# Patient Record
Sex: FEMALE | Race: WHITE | NOT HISPANIC OR LATINO | URBAN - METROPOLITAN AREA
[De-identification: names, ages, dates, MRNs, and addresses within clinical notes are randomized per-mention and may not be internally consistent; named-entity substitution may affect disease eponyms.]

---

## 2018-09-13 ENCOUNTER — TRANSCRIBE ORDERS (OUTPATIENT)
Dept: INTERNAL MEDICINE | Facility: CLINIC | Age: 78
End: 2018-09-13

## 2018-09-13 DIAGNOSIS — I63.411 CEREBRAL INFARCTION DUE TO EMBOLISM OF RIGHT MIDDLE CEREBRAL ARTERY (CMS/HCC): Primary | ICD-10-CM

## 2020-11-23 ENCOUNTER — TRANSCRIBE ORDERS (OUTPATIENT)
Dept: SCHEDULING | Age: 80
End: 2020-11-23

## 2020-11-23 DIAGNOSIS — Z11.59 ENCOUNTER FOR SCREENING FOR OTHER VIRAL DISEASES: Primary | ICD-10-CM

## 2020-12-04 ENCOUNTER — HOSPITAL ENCOUNTER (OUTPATIENT)
Facility: HOSPITAL | Age: 80
Setting detail: HOSPITAL OUTPATIENT SURGERY
End: 2020-12-04
Attending: INTERNAL MEDICINE | Admitting: INTERNAL MEDICINE
Payer: MEDICARE

## 2020-12-04 DIAGNOSIS — I49.8 NODAL RHYTHM DISORDER: ICD-10-CM

## 2020-12-04 DIAGNOSIS — I48.0 PAROXYSMAL ATRIAL FIBRILLATION (CMS/HCC): ICD-10-CM

## 2020-12-04 NOTE — H&P
Electrophysiology History & Physical    Date of Service: 12/10/20    Mr. Yee is a pleasant 80 year old male patient of Dr. Araujo presenting to the [facility] Electrophysiology Lab for an elective atrial flutter ablation procedure.  The patient was last seen in the office by Dr. Nakul Swan on 11/20/20.  The chart/records were reviewed, the patient was interviewed and examined.  The procedure technique was re-reviewed with additional questions answered to their verbal understanding.  Risks, alternatives, and potential therapeutic benefits were discussed.  Patient is noted to be taking systemic anticoagulation.  Last dose of Xarelto was ***.  The fact that she is at increased risk for bleeding and bleeding complication was discussed.  Last dose of Toprol XL was ***.  Patient has been NPO since midnight.  Admits to ***.  ***.  Denies recent illness, fever, syncope, dizziness, cough, chest pain, palpitations, nausea, vomiting, diarrhea, edema, myalgia, neuropathy.    Consent form was reviewed, patient executed document verbalizing understanding and wish to proceed.    I have reviewed and agree with the attached H&P.  There have been no pertinent changes to this patient's history since they were last seen in the office.  The most recent laboratory values and today's physical exam are noted below.    Labs:     Ref. Range 12/5/2020 07:54   Sodium Latest Ref Range: 136 - 144 mEQ/L 141   Potassium Latest Ref Range: 3.6 - 5.1 mEQ/L 3.7   Chloride Latest Ref Range: 98 - 109 mEQ/L 99   CO2 Latest Ref Range: 22 - 32 mEQ/L 31   BUN Latest Ref Range: 8 - 20 mg/dL 17   Creatinine Latest Ref Range: 0.6 - 1.1 mg/dL 0.8   Glucose Latest Ref Range: 70 - 99 mg/dL 95   Calcium Latest Ref Range: 8.9 - 10.3 mg/dL 10.6 (H)   AST (SGOT) Latest Ref Range: 15 - 41 IU/L 33   ALT (SGPT) Latest Ref Range: 11 - 54 IU/L 24   Alkaline Phosphatase Latest Ref Range: 35 - 126 IU/L 69   Total Protein Latest Ref Range: 6.0 - 8.2 g/dL 6.7    Albumin Latest Ref Range: 3.4 - 5.0 g/dL 4.1   Bilirubin, Total Latest Ref Range: 0.3 - 1.2 mg/dL 1.2   eGFR Latest Ref Range: >=60.0 mL/min/1.73m*2 >60.0   Anion Gap Latest Ref Range: 3 - 15 mEQ/L 11   WBC Latest Ref Range: 3.80 - 10.50 K/uL 10.44   RBC Latest Ref Range: 3.93 - 5.22 M/uL 4.64   Hemoglobin Latest Ref Range: 11.8 - 15.7 g/dL 15.0   Hematocrit Latest Ref Range: 35.0 - 45.0 % 43.6   MCV Latest Ref Range: 83.0 - 98.0 fL 94.0   MCH Latest Ref Range: 28.0 - 33.2 pg 32.3   MCHC Latest Ref Range: 32.2 - 35.5 g/dL 34.4   RDW Latest Ref Range: 11.7 - 14.4 % 13.2   Platelets Latest Ref Range: 150 - 369 K/uL 306   MPV Latest Ref Range: 9.4 - 12.3 fL 9.5   SARS CoV 2 RNA Latest Ref Range: NOT DETECTED  NOT DETECTED       Physical Exam:  Vital signs ***    ***      Date of service: 11/20/2020 1:45 PM    Background     Reason for Visit: Consultation    History from: Patient    Chief Complaint   #1: Atrial flutter    History of Present Illness:  Ms. Yee Is an 80-year-old  white female who presents to see me in consultation for atrial arrhythmias.  The patient has been diagnosed with atrial fibrillation / atrial flutter.  She is asymptomatic with her arrhythmia.  She does have a history of valvular heart disease.  She does have preserved left ventricular function.  She has severe mitral regurgitation. She has a markedly dilated left atrium.    The patient has suffered with a stroke in the past.  She had a stroke approximately 3 years ago.  She had weakness in her left hand  and left leg.  This has mostly resolved.  she did not have atrial fibrillation at the time of her stroke.      She does have hypertension.  She is not a diabetic and she has never suffered with a myocardial infarction.    Currently the patient feels well.  She denies shortness of breath except when wearing a mask.  Her daughter tells me that she does think that her mother has been short of breath for a number of years.She is  currently on Xarelto and Toprol.  Unfortunately a ventricular response rate is 101 beats per minute at rest today.    Covid-19 CoronaVirus Screening    Past Medical and Surgical Histories   Past Medical History (reviewed - no changes required):   Hypertension.  Mild cerebrovascular disease.  Hypercholesterolemia.  Rt hemispheric Stroke 9/2017.  Atrial flutter 2020.     Echo 10/2020:  EF 60%.  Mitral annular calcifications with moderate to severe MR. Aortic sclerosis with mild AI.  Mild to moderate TR with PA pressure 40 mm Hg.  ECHO 9/2017:  EF 60-65%. Normal LV function. Trace MR. Trace AI. Mild TR with PA pressure 40-45 mm. Right heart dilatation.   ECHO 9/2016:  EF 63%. Normal left ventricular wall motion. Stage II diastolic dysfunction. Mitral and aortic valvular sclerosis. Mild AI. Mild to moderate TR with PA pressure 51 mm. Mild to moderate MR.    NUCSTRESS 7/2017:  EF 66%. Negative for ischemia.     CAROTID 9/2017:  Plaque origin right internal carotid artery without hemodynamic significance.   CAROTIDDUPLEX 4/2016:  <50% ICA.  Carotid duplex 2011: Mild plaque without hemodynamically significant stenosis.    CARDIONET 2017: Sinus rhythm with average heart rate of 54 and range of 42-96 bpm.  Frequent PVCs averaging 260/h.  Occasional PACs averaging 12/h.  Up to 11 beats of SVT.  No atrial fibrillation or atrial flutter seen.      Anemia.   Basal cell CA leg.  Squamous cell CA leg.  Cataract surgery 2016.    Flu shot given 10/22/19 by Dr.Mary Jazzy Pedro office.  Past Surgical History (reviewed - no changes required):   Pessary 2019.    Social History     Smoked Tobacco Usage    Smoking Status:  Never    Detailed Status:  Never smoker    Smokeless Tobacco Usage     Smokeless Status:   Never    Alcohol Usage     Alcohol Status:  Current    She drinks wine infrequently.    Average drink(s) / day: Infrequent    Mostly:   Wine    Additional Social History (reviewed - no changes required):    with 1 son and 2  daughters  -retired.    Family History   Structured Family History  [01]  Father:  FH Cancer;  (11/3/2015).  [01]  Mother:  FH Cancer;  (11/3/2015).  Family History (reviewed - no changes required):   mother , CA @87  father  , prostate  grandmother MI @68     Review of Systems   General: Acknowledges weight loss. Denies weakness and weight gain.   Eyes: Denies change in vision.   Ears/Nose/Throat: Denies decreased hearing.   Cardiovascular: Denies chest pain at rest, chest pain with exercise, palpitations, peripheral edema, PND, orthopnea, dyspnea on exertion and orthostatic symptoms.   Respiratory: Denies productive cough and shortness of breath.   Gastrointestinal: Acknowledges GERD. Denies nausea and vomiting.   Musculoskeletal: Denies myalgias and joint pain.   Skin: Denies rash and itching.   Neurologic: Denies pre-syncope and syncope.   Psychiatric: Acknowledges depression and anxiety.   Endocrine: Denies cold intolerance and heat intolerance.   Heme/Lymphatic: Denies abnormal bruising and bleeding.   Allergic/Immunologic: Acknowledges seasonal allergies. Denies allergic rash.     Physical Exam  Vital Signs:     Height: 60 inches  (10/21/2020).    Weight: 120 pounds    BP: 122/80 mmHg.    Body Mass Index: 23.52    Body Surface Area: 1.50 m2.  General: Well developed and well nourished.   Eyes: ALISA, conjunctiva and sclera clear and no xanthomas.   Neck: Supple, no adenopathy and no neck masses.   Lungs: Clear to auscultation.   Chest: No obvious deformity.   Heart: S1 and S2 physiologic. Positive for irreg/irreg poorly controlled response. Examination of neck veins reveals no neck vein distention.  Carotid artery examination reveals no carotid bruits.    Extremities: There is no peripheral edema cyanosis or clubbing.   Pulses: Radial pulses are normal bilaterally.   Abdomen: Normoactive bowel sounds and soft and non-tender.   Musculoskeletal: Normal gait and normal  strength and tone.   Skin: Warm and dry and no rashes.   Neurologic: Alert, oriented x 3 and appropriate affect.       Resting Electrocardiogram   ECG Date:  11/20/2020   Atrial flutter   Heart Rate: 101 bpm.  QRS: 94 msec. QT: 312 msec.   Cannot exclude old inferior infarction   Cannot exclude old anterior infarction      Medications:   XARELTO 20 MG ORAL TABLET (RIVAROXABAN) one by mouth once a day; Route: ORAL  ATORVASTATIN CALCIUM 80 MG ORAL TABLET (ATORVASTATIN CALCIUM) take half tablet by mouth in the morning andf half tablet by mouth in the evening; Route: ORAL  EZETIMIBE 10MG TABLETS (EZETIMIBE) TAKE 1 TABLET BY MOUTH EVERY DAY  TOPROL XL 50 MG ORAL TABLET EXTENDED RELEASE 24 HOUR (METOPROLOL SUCCINATE) one by mouth twice daily  TRIAMTERENE-HCTZ 37.5-25 MG ORAL TABLET (TRIAMTERENE-HCTZ) one tablet daily  AMLODIPINE BESYLATE 5 MG ORAL TABLET (AMLODIPINE BESYLATE) 1 tablet daily by mouth; Route: ORAL  LEXAPRO 10 MG ORAL TABLET (ESCITALOPRAM OXALATE) 1 tab daily; Route: ORAL  [employer] OMEPRAZOLE 20 MG ORAL TABLET DELAYED RELEASE (OMEPRAZOLE) one tablet daily; Route: ORAL  VITAMIN D3 2000 UNIT ORAL TABLET (CHOLECALCIFEROL) one tablet twice daily by mouth; Route: ORAL  DAILY MULTIVITAMIN ORAL CAPSULE (MULTIPLE VITAMINS-MINERALS) one tablet daily by mouth; Route: ORAL  LEVOTHYROXINE SODIUM 50 MCG ORAL TABLET (LEVOTHYROXINE SODIUM) 1 tablet daily by mouth; Route: ORAL  Medications reviewed today.    Medications list verified with patient by Nakul Swan M.D..    Allergies, Intolerances and/or Therapeutic Variances:    SCOPOLAMINE HBR (SCOPOLAMINE HBR)  [DRUG]  (Onset: 05/01/2012) Critical: years ago pt intolerant  ASPIRIN (ASPIRIN)  [DRUG]  (Onset: 04/22/2015) Moderate: GERD  Allergies and adverse reactions reviewed today.      Problems:   ATRIAL FLUTTER, PAROXYSMAL (ICD-427.32) (JDR30-D13.0)  ATRIAL TACHYCARDIA, ECTOPIC YESIKA RHYTHM (ICD-427.89) (MWZ93-W70.8)  COVID-19 COUNSELING PERFORMED (ICD-V65.49)  (DOP88-Y14.89)  CVA WITH LEFT HEMIPARESIS (ICD-438.20) (YKF70-S18.959)  PVC'S (ICD-427.69) (DXA21-G73.3)  AORTIC INSUFFICIENCY (ICD-424.1) (PRP81-N41.1)  MITRAL REGURGITATION (ICD-424.0) (YLC38-F58.0)  ABNORMAL ELECTROCARDIOGRAM (ICD-794.31) (OQH16-Z37.31)  HYPOTHYROIDISM (ICD-244.9) (JYG39-E90.9)  HYPERCHOLESTEROLEMIA (ICD-272.0) (LDB45-Z03.0)  CARDIAC MURMUR, UNSPECIFIED (ICD-785.2) (XPD33-O95.1)  HYPERTENSION, BENIGN ESSENTIAL (ICD-401.1) (UGA66-W03)  CAROTID ARTERY DISEASE MILD (ICD-433.10) (KKC27-C20.29)  Problems reviewed today.      Impression:  Ms. Yee Is an 80-year-old white female with the following problems:     1. Persistent atrial flutter - the patient is in atrial flutter with an uncontrolled ventricular response rate.  She was in normal rhythm a year ago.  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  She has been anticoagulated with Xarelto.  I recommend that she consider undergoing an ablation procedure to restore sinus rhythm.  This may improve her mitral regurgitation.  Her left atrium is however already significantly enlarged and it is likely that in the long run, she will go into atrial fibrillation.  The patient is relatively asymptomatic.  I have explained the potential risks of the ablation in detail to the patient and her daughter.    2.  PVCs - the patient was noted to have PVCs on her 24 hour Holter.  There were not significant.    3.  Significant mitral regurgitation -the patient's left atrium is enlarged.  She has atrial flutter.  It is likely that she will develop atrial fibrillation in the future and may need a mitral valve repair or replacement.    Return visit as needed.    Medical Decision Making: Review or Order Labs,Rev/Ordr Med Test (ECG; echo; cath)    Electronically Signed by Nakul Swan M.D. on 11/20/2020 at 2:36 PM

## 2020-12-05 ENCOUNTER — APPOINTMENT (OUTPATIENT)
Dept: LAB | Facility: HOSPITAL | Age: 80
End: 2020-12-05
Attending: INTERNAL MEDICINE
Payer: MEDICARE

## 2020-12-05 ENCOUNTER — TRANSCRIBE ORDERS (OUTPATIENT)
Dept: LAB | Facility: HOSPITAL | Age: 80
End: 2020-12-05

## 2020-12-05 DIAGNOSIS — Z11.59 ENCOUNTER FOR SCREENING FOR OTHER VIRAL DISEASES: ICD-10-CM

## 2020-12-05 DIAGNOSIS — I10 ESSENTIAL (PRIMARY) HYPERTENSION: ICD-10-CM

## 2020-12-05 DIAGNOSIS — I34.0 NONRHEUMATIC MITRAL (VALVE) INSUFFICIENCY: ICD-10-CM

## 2020-12-05 DIAGNOSIS — R94.31 ABNORMAL ELECTROCARDIOGRAM (ECG) (EKG): ICD-10-CM

## 2020-12-05 DIAGNOSIS — I35.1 NONRHEUMATIC AORTIC (VALVE) INSUFFICIENCY: ICD-10-CM

## 2020-12-05 DIAGNOSIS — I48.0 PAROXYSMAL ATRIAL FIBRILLATION (CMS/HCC): ICD-10-CM

## 2020-12-05 DIAGNOSIS — I49.3 VENTRICULAR PREMATURE DEPOLARIZATION: ICD-10-CM

## 2020-12-05 DIAGNOSIS — I48.0 PAROXYSMAL ATRIAL FIBRILLATION (CMS/HCC): Primary | ICD-10-CM

## 2020-12-05 LAB
ALBUMIN SERPL-MCNC: 4.1 G/DL (ref 3.4–5)
ALP SERPL-CCNC: 69 IU/L (ref 35–126)
ALT SERPL-CCNC: 24 IU/L (ref 11–54)
ANION GAP SERPL CALC-SCNC: 11 MEQ/L (ref 3–15)
AST SERPL-CCNC: 33 IU/L (ref 15–41)
BILIRUB SERPL-MCNC: 1.2 MG/DL (ref 0.3–1.2)
BUN SERPL-MCNC: 17 MG/DL (ref 8–20)
CALCIUM SERPL-MCNC: 10.6 MG/DL (ref 8.9–10.3)
CHLORIDE SERPL-SCNC: 99 MEQ/L (ref 98–109)
CO2 SERPL-SCNC: 31 MEQ/L (ref 22–32)
CREAT SERPL-MCNC: 0.8 MG/DL (ref 0.6–1.1)
ERYTHROCYTE [DISTWIDTH] IN BLOOD BY AUTOMATED COUNT: 13.2 % (ref 11.7–14.4)
GFR SERPL CREATININE-BSD FRML MDRD: >60 ML/MIN/1.73M*2
GLUCOSE SERPL-MCNC: 95 MG/DL (ref 70–99)
HCT VFR BLDCO AUTO: 43.6 % (ref 35–45)
HGB BLD-MCNC: 15 G/DL (ref 11.8–15.7)
MCH RBC QN AUTO: 32.3 PG (ref 28–33.2)
MCHC RBC AUTO-ENTMCNC: 34.4 G/DL (ref 32.2–35.5)
MCV RBC AUTO: 94 FL (ref 83–98)
PDW BLD AUTO: 9.5 FL (ref 9.4–12.3)
PLATELET # BLD AUTO: 306 K/UL (ref 150–369)
POTASSIUM SERPL-SCNC: 3.7 MEQ/L (ref 3.6–5.1)
PROT SERPL-MCNC: 6.7 G/DL (ref 6–8.2)
RBC # BLD AUTO: 4.64 M/UL (ref 3.93–5.22)
SODIUM SERPL-SCNC: 141 MEQ/L (ref 136–144)
WBC # BLD AUTO: 10.44 K/UL (ref 3.8–10.5)

## 2020-12-05 PROCEDURE — 80053 COMPREHEN METABOLIC PANEL: CPT

## 2020-12-05 PROCEDURE — 85027 COMPLETE CBC AUTOMATED: CPT

## 2020-12-05 PROCEDURE — 36415 COLL VENOUS BLD VENIPUNCTURE: CPT

## 2020-12-05 PROCEDURE — U0003 INFECTIOUS AGENT DETECTION BY NUCLEIC ACID (DNA OR RNA); SEVERE ACUTE RESPIRATORY SYNDROME CORONAVIRUS 2 (SARS-COV-2) (CORONAVIRUS DISEASE [COVID-19]), AMPLIFIED PROBE TECHNIQUE, MAKING USE OF HIGH THROUGHPUT TECHNOLOGIES AS DESCRIBED BY CMS-2020-01-R: HCPCS

## 2020-12-07 LAB — SARS-COV-2 RNA RESP QL NAA+PROBE: NOT DETECTED

## 2020-12-09 ENCOUNTER — ANESTHESIA EVENT (OUTPATIENT)
Dept: CARDIOLOGY | Facility: HOSPITAL | Age: 80
End: 2020-12-09
Payer: MEDICARE

## 2020-12-10 ENCOUNTER — ANESTHESIA (OUTPATIENT)
Dept: CARDIOLOGY | Facility: HOSPITAL | Age: 80
End: 2020-12-10
Payer: MEDICARE

## 2020-12-10 ENCOUNTER — HOSPITAL ENCOUNTER (OUTPATIENT)
Dept: CARDIOLOGY | Facility: HOSPITAL | Age: 80
Discharge: HOME | End: 2020-12-10
Attending: INTERNAL MEDICINE | Admitting: INTERNAL MEDICINE
Payer: MEDICARE

## 2020-12-10 VITALS
HEIGHT: 60 IN | TEMPERATURE: 98.5 F | BODY MASS INDEX: 23.56 KG/M2 | HEART RATE: 76 BPM | SYSTOLIC BLOOD PRESSURE: 119 MMHG | OXYGEN SATURATION: 97 % | DIASTOLIC BLOOD PRESSURE: 59 MMHG | RESPIRATION RATE: 19 BRPM | WEIGHT: 120 LBS

## 2020-12-10 DIAGNOSIS — I48.0 PAROXYSMAL ATRIAL FIBRILLATION (CMS/HCC): ICD-10-CM

## 2020-12-10 DIAGNOSIS — I48.0 AF (PAROXYSMAL ATRIAL FIBRILLATION) (CMS/HCC): ICD-10-CM

## 2020-12-10 DIAGNOSIS — I49.8 NODAL RHYTHM DISORDER: ICD-10-CM

## 2020-12-10 PROCEDURE — 25800000 HC PHARMACY IV SOLUTIONS: Performed by: ANESTHESIOLOGY

## 2020-12-10 PROCEDURE — 93325 DOPPLER ECHO COLOR FLOW MAPG: CPT | Mod: GA

## 2020-12-10 PROCEDURE — 63600000 HC DRUGS/DETAIL CODE: Performed by: ANESTHESIOLOGY

## 2020-12-10 PROCEDURE — C1731 CATH, EP, 20 OR MORE ELEC: HCPCS | Performed by: INTERNAL MEDICINE

## 2020-12-10 PROCEDURE — 25000000 HC PHARMACY GENERAL: Performed by: ANESTHESIOLOGY

## 2020-12-10 PROCEDURE — 25000000 HC PHARMACY GENERAL: Performed by: INTERNAL MEDICINE

## 2020-12-10 PROCEDURE — B246ZZ4 ULTRASONOGRAPHY OF RIGHT AND LEFT HEART, TRANSESOPHAGEAL: ICD-10-PCS | Performed by: INTERNAL MEDICINE

## 2020-12-10 PROCEDURE — 93620 COMP EP EVL R AT VEN PAC&REC: CPT | Performed by: INTERNAL MEDICINE

## 2020-12-10 PROCEDURE — 71000001 HC PACU PHASE 1 INITIAL 30MIN: Performed by: INTERNAL MEDICINE

## 2020-12-10 PROCEDURE — 71000011 HC PACU PHASE 1 EA ADDL MIN: Performed by: INTERNAL MEDICINE

## 2020-12-10 PROCEDURE — C1894 INTRO/SHEATH, NON-LASER: HCPCS | Performed by: INTERNAL MEDICINE

## 2020-12-10 PROCEDURE — 93005 ELECTROCARDIOGRAM TRACING: CPT | Performed by: INTERNAL MEDICINE

## 2020-12-10 PROCEDURE — 4A023FZ MEASUREMENT OF CARDIAC RHYTHM, PERCUTANEOUS APPROACH: ICD-10-PCS | Performed by: INTERNAL MEDICINE

## 2020-12-10 PROCEDURE — 37000002 HC ANESTHESIA MAC: Performed by: INTERNAL MEDICINE

## 2020-12-10 PROCEDURE — C1730 CATH, EP, 19 OR FEW ELECT: HCPCS | Performed by: INTERNAL MEDICINE

## 2020-12-10 PROCEDURE — 27200000 HC STERILE SUPPLY: Performed by: INTERNAL MEDICINE

## 2020-12-10 RX ORDER — AMIODARONE HYDROCHLORIDE 200 MG/1
200 TABLET ORAL DAILY
Qty: 30 TABLET | Refills: 1 | Status: SHIPPED | OUTPATIENT
Start: 2020-12-10 | End: 2021-01-09

## 2020-12-10 RX ORDER — AMLODIPINE BESYLATE 5 MG/1
5 TABLET ORAL DAILY
COMMUNITY

## 2020-12-10 RX ORDER — METOPROLOL TARTRATE 50 MG/1
50 TABLET ORAL 2 TIMES DAILY
COMMUNITY

## 2020-12-10 RX ORDER — LEVOTHYROXINE SODIUM 50 UG/1
50 TABLET ORAL
COMMUNITY

## 2020-12-10 RX ORDER — PROPOFOL 200MG/20ML
SYRINGE (ML) INTRAVENOUS AS NEEDED
Status: DISCONTINUED | OUTPATIENT
Start: 2020-12-10 | End: 2020-12-10 | Stop reason: SURG

## 2020-12-10 RX ORDER — TRIAMTERENE/HYDROCHLOROTHIAZID 37.5-25 MG
1 TABLET ORAL EVERY MORNING
COMMUNITY

## 2020-12-10 RX ORDER — ATORVASTATIN CALCIUM 40 MG/1
40 TABLET, FILM COATED ORAL 2 TIMES DAILY
COMMUNITY

## 2020-12-10 RX ORDER — LIDOCAINE HYDROCHLORIDE 10 MG/ML
INJECTION, SOLUTION INFILTRATION; PERINEURAL AS NEEDED
Status: DISCONTINUED | OUTPATIENT
Start: 2020-12-10 | End: 2020-12-10 | Stop reason: HOSPADM

## 2020-12-10 RX ORDER — ACETAMINOPHEN 325 MG/1
650 TABLET ORAL EVERY 4 HOURS PRN
Status: DISCONTINUED | OUTPATIENT
Start: 2020-12-10 | End: 2020-12-10 | Stop reason: HOSPADM

## 2020-12-10 RX ORDER — PROPOFOL 10 MG/ML
INJECTION, EMULSION INTRAVENOUS CONTINUOUS PRN
Status: DISCONTINUED | OUTPATIENT
Start: 2020-12-10 | End: 2020-12-10 | Stop reason: SURG

## 2020-12-10 RX ORDER — EZETIMIBE AND SIMVASTATIN 10; 10 MG/1; MG/1
1 TABLET ORAL NIGHTLY
COMMUNITY

## 2020-12-10 RX ORDER — SODIUM CHLORIDE 9 MG/ML
INJECTION, SOLUTION INTRAVENOUS CONTINUOUS PRN
Status: DISCONTINUED | OUTPATIENT
Start: 2020-12-10 | End: 2020-12-10 | Stop reason: SURG

## 2020-12-10 RX ORDER — EPHEDRINE SULFATE/0.9% NACL/PF 50 MG/5 ML
SYRINGE (ML) INTRAVENOUS AS NEEDED
Status: DISCONTINUED | OUTPATIENT
Start: 2020-12-10 | End: 2020-12-10 | Stop reason: SURG

## 2020-12-10 RX ORDER — ESCITALOPRAM OXALATE 10 MG/1
10 TABLET ORAL DAILY
COMMUNITY

## 2020-12-10 RX ORDER — LIDOCAINE HYDROCHLORIDE 10 MG/ML
INJECTION, SOLUTION EPIDURAL; INFILTRATION; INTRACAUDAL; PERINEURAL AS NEEDED
Status: DISCONTINUED | OUTPATIENT
Start: 2020-12-10 | End: 2020-12-10 | Stop reason: SURG

## 2020-12-10 RX ORDER — FENTANYL CITRATE 50 UG/ML
INJECTION, SOLUTION INTRAMUSCULAR; INTRAVENOUS AS NEEDED
Status: DISCONTINUED | OUTPATIENT
Start: 2020-12-10 | End: 2020-12-10 | Stop reason: SURG

## 2020-12-10 RX ADMIN — PROPOFOL 50 MCG/KG/MIN: 10 INJECTION, EMULSION INTRAVENOUS at 10:55

## 2020-12-10 RX ADMIN — PROPOFOL 30 MG: 10 INJECTION, EMULSION INTRAVENOUS at 11:10

## 2020-12-10 RX ADMIN — SODIUM CHLORIDE: 9 INJECTION, SOLUTION INTRAVENOUS at 10:41

## 2020-12-10 RX ADMIN — Medication 5 MG: at 11:17

## 2020-12-10 RX ADMIN — FENTANYL CITRATE 25 MCG: 50 INJECTION INTRAMUSCULAR; INTRAVENOUS at 11:13

## 2020-12-10 RX ADMIN — LIDOCAINE HYDROCHLORIDE 5 ML: 10 INJECTION, SOLUTION EPIDURAL; INFILTRATION; INTRACAUDAL at 10:51

## 2020-12-10 RX ADMIN — PROPOFOL 30 MG: 10 INJECTION, EMULSION INTRAVENOUS at 11:48

## 2020-12-10 RX ADMIN — PROPOFOL 50 MG: 10 INJECTION, EMULSION INTRAVENOUS at 10:55

## 2020-12-10 RX ADMIN — Medication 10 MG: at 11:19

## 2020-12-10 RX ADMIN — Medication 5 MG: at 11:25

## 2020-12-10 SDOH — HEALTH STABILITY: MENTAL HEALTH: HOW OFTEN DO YOU HAVE A DRINK CONTAINING ALCOHOL?: 2-4 TIMES A MONTH

## 2020-12-10 ASSESSMENT — ENCOUNTER SYMPTOMS: DYSRHYTHMIAS: 1

## 2020-12-10 NOTE — POST-PROCEDURE NOTE
EP Service Post Op Check- EP Study and Cardioversion     80 y.o. female s/p EP study and Cardioversion by Dr. Swan today. She is feeling well. She denies chest pain or shortness of breath. She denies groin pain or back pain.     Visit Vitals  BP (!) 148/81   Pulse (!) 129   Temp 36.9 °C (98.5 °F) (Temporal)   Resp 18   Ht 1.524 m (5')   Wt 54.4 kg (120 lb)   SpO2 98%   BMI 23.44 kg/m²       Physical Exam:  General Appearance: NAD  HEENT: NCAT, MMM, PERRLA, EOMs intact, neck supple  Heart: RRR, S1, S2. No murmurs, rubs or gallops, no JVD  Lungs: CTA bilaterally. No wheezes, rales or rhonchi  Abdomen: soft, non-tender, non-distended  Groins: left groin soft, no bleeding, bruit or hematoma  Extremities: warm, peripheral pulses 2+ bilaterally, no pedal edema bilaterally  Skin: warm, dry, no rashes  Neuro: A&O x3, no focal neurologic deficits     Assessment/Plan:  1) Atrial flutter s/p EP study and Electrical Cardioversion  -recover in holding area  -bedrest x 4 hours  -pressure dressing to groin overnight  -EKG post  -Start amiodarone 200 mg PO q D  -resume Xarelto 20 mg PO q D with dinner  -continue metoprolol 50 mg PO q D  -D/C home later this afternoon if VSS, groin site stable, and patient able to ambulate  -F/U with Dr. Swan as an outpatient on 1/18/21 at 8:30 am    TESFAYE Mendez  12/10/2020 12:24 PM

## 2020-12-10 NOTE — ANESTHESIA POSTPROCEDURE EVALUATION
Patient: Carmen Yee    Procedure Summary     Date: 12/10/20 Room / Location: [facility] EP LAB 3 / [facility] CATH/EP    Anesthesia Start: 1042 Anesthesia Stop: 1220    Procedure: COMPLETE EP STUDY WITH INDUCTION (N/A ) Diagnosis:       Atypical atrial flutter (CMS/HCC)      (Atypical atrial flutter)    Provider: Nakul Swan MD Responsible Provider: Mila Trent MD    Anesthesia Type: MAC ASA Status: 3          Anesthesia Type: MAC  PACU Vitals  12/10/2020 1215 - 12/10/2020 1251      12/10/2020  1223             BP:  (!) 116/54    Pulse:  75    Resp:  16    SpO2:  98 %            Anesthesia Post Evaluation    Pain management: adequate  Patient location during evaluation: PACU  Patient participation: complete - patient participated  Level of consciousness: awake and alert  Cardiovascular status: acceptable  Airway Patency: adequate  Respiratory status: acceptable  Hydration status: acceptable  Anesthetic complications: no

## 2020-12-10 NOTE — Clinical Note
Patient placed on procedure table in supine position with arms at side. Positioning devices: all pressure points padded, arm board under arms, heel pads in place, safety strap applied, wrist straps in place and donut foam under head.

## 2020-12-10 NOTE — ANESTHESIA PREPROCEDURE EVALUATION
Relevant Problems   No relevant active problems       Anesthesia ROS/MED HX    Anesthesia History    Previous anesthetics  Pulmonary - neg  Neuro/Psych    TIA  Cardiovascular   hypertension  Dysrhythmias and atrial flutter   Echocardiogram reviewed, Covid19 Test Reviewed and ECG reviewed  Hematological    anticoagulants (xarelto)  GI/Hepatic- neg  Musculoskeletal- neg  Renal Disease- neg  Endo/Other   Hypothyroidism       History reviewed. No pertinent surgical history.    Physical Exam    Airway   Mallampati: II   TM distance: >3 FB   Neck ROM: full  Cardiovascular - normal   Rhythm: regular   Rate: normalPulmonary - normal   clear to auscultation  Dental - normal        Anesthesia Plan    Plan: MAC    Technique: MAC     Airway: natural airway / supplemental oxygen   ASA 3  Anesthetic plan and risks discussed with: patient  Induction:    intravenous   Postop Plan:   Patient Disposition: phase II then home   Pain Management: IV analgesics

## 2020-12-10 NOTE — DISCHARGE INSTRUCTIONS
Please follow up with cardiology, as directed for a follow-up appointment on Monday January 18, 2021 at 8:30 am with Dr. Swan located at [address] [place] [address]. [address], [place], [place].  For any questions or concerns, please do not hesitate to contact the office at [phone].     Medications:  Please start taking Amiodarone 200 mg by mouth daily.  Continue taking Xarelto 20 mg by mouth daily with dinner.  Continue taking your metoprolol 50 mg by mouth twice a day.  Please continue taking your other medications as previously prescribed.    Post Electrophysiology Study and Cardioversion Discharge Instructions    Groin Site Care:    • A bruise or small lump under the skin where the catheters were is normal. These usually go away within one week.    • Please check your wound site daily to make sure there is no redness, bleeding, or swelling.    • The groin dressing should be removed the day following the procedure. A Band-Aid can be used if needed.    Showering:    • You may shower 24 hours after your procedure. Clean the area with mild soap and water. Do not rub the area. Pat the area dry with a clean towel.    • Do not take a bath or submerge the area under water for three days.    • If you have bleeding where your catheter was:   Lie down and hold direct pressure for 10 minutes. If bleeding does not stop in 10 minutes, or if there is a large amount of bleeding, call 911. If bleeding does stop, rest for at least 4 hours. Call your doctor.    Activity:    • Do not bend over, strain, push, pull, run or lift anything over 10 pounds for 7 days.    • You may exercise in 7 days.    • You may return to work in 7 days.    • You may walk and climb stairs when you return home.    Driving:    • Do not drive a car or use any machinery for 3 days. Someone else must drive you home today.    Diet:    • You may eat your normal diet as per your doctor.    Call your doctor if you have:    • Increased redness, heat, pus, bruising, or  bleeding at the site after 24 hours.    • Swelling at the catheter site.    • Increased pain at the catheter site.    • Numbness, pain or coolness in the leg.    • Temperature of 100.5 degrees Fahrenheit or greater.    • Chest pain or shortness of breath.

## 2020-12-10 NOTE — POST-PROCEDURE NOTE
Same Day Discharge        Discharge instructions given with the emphasis on importance of follow-up visit with physician, necessity of taking antiplatelet medications and evaluation of access site until healed.           No questions at this time. Patient verbalized understanding.

## 2020-12-10 NOTE — Clinical Note
Patient position: flat. Bite block inserted. FARZANA probe inserted via blind insertion technique. Probe inserted without difficulty. FARZANA in progress.

## 2020-12-10 NOTE — H&P
Electrophysiology History & Physical    Date of Service: 12/10/20    Mr. Yee is a pleasant 80 year old male patient of Dr. Araujo presenting to the [facility] Electrophysiology Lab for an elective atrial flutter ablation procedure.  The patient was last seen in the office by Dr. Nakul Swan on 11/20/20.  The chart/records were reviewed, the patient was interviewed and examined.  The procedure technique was re-reviewed with additional questions answered to their verbal understanding.  Risks, alternatives, and potential therapeutic benefits were discussed.  Patient is noted to be taking systemic anticoagulation.  Last dose of Xarelto was yesterday evening.  The fact that she is at increased risk for bleeding and bleeding complication was discussed.  Last dose of Toprol XL was yesterday.  Patient has been NPO since midnight.  She states that she is largely asymptomatic with her atrial flutter. Denies recent illness, fever, syncope, dizziness, cough, chest pain, palpitations, nausea, vomiting, diarrhea, edema, myalgia, neuropathy. She has a history of a right sided stroke approximately 3 years ago however all left sided deficits have resolved. She presents today in atrial flutter.    Consent form was reviewed, patient executed document verbalizing understanding and wish to proceed.    I have reviewed and agree with the attached H&P.  There have been no pertinent changes to this patient's history since they were last seen in the office.  The most recent laboratory values and today's physical exam are noted below.    Plan:  1) persistent atrial flutter vs. Atrial tachycardia- presents today for ablation  - given her largely asymptomatic nature we will proceed with right sided ablation, if arrhythmia proves to be originating from the left atrium then we will pursue cardioversion and antiarrhythmic initiation  - will require FARZANA prior to procedure to rule out RUDI thrombus  - continue uninterrupted xarelto  tonight  - post op care to be determined after the procedure  - likely discharge to home today post op    Labs:     Ref. Range 12/5/2020 07:54   Sodium Latest Ref Range: 136 - 144 mEQ/L 141   Potassium Latest Ref Range: 3.6 - 5.1 mEQ/L 3.7   Chloride Latest Ref Range: 98 - 109 mEQ/L 99   CO2 Latest Ref Range: 22 - 32 mEQ/L 31   BUN Latest Ref Range: 8 - 20 mg/dL 17   Creatinine Latest Ref Range: 0.6 - 1.1 mg/dL 0.8   Glucose Latest Ref Range: 70 - 99 mg/dL 95   Calcium Latest Ref Range: 8.9 - 10.3 mg/dL 10.6 (H)   AST (SGOT) Latest Ref Range: 15 - 41 IU/L 33   ALT (SGPT) Latest Ref Range: 11 - 54 IU/L 24   Alkaline Phosphatase Latest Ref Range: 35 - 126 IU/L 69   Total Protein Latest Ref Range: 6.0 - 8.2 g/dL 6.7   Albumin Latest Ref Range: 3.4 - 5.0 g/dL 4.1   Bilirubin, Total Latest Ref Range: 0.3 - 1.2 mg/dL 1.2   eGFR Latest Ref Range: >=60.0 mL/min/1.73m*2 >60.0   Anion Gap Latest Ref Range: 3 - 15 mEQ/L 11   WBC Latest Ref Range: 3.80 - 10.50 K/uL 10.44   RBC Latest Ref Range: 3.93 - 5.22 M/uL 4.64   Hemoglobin Latest Ref Range: 11.8 - 15.7 g/dL 15.0   Hematocrit Latest Ref Range: 35.0 - 45.0 % 43.6   MCV Latest Ref Range: 83.0 - 98.0 fL 94.0   MCH Latest Ref Range: 28.0 - 33.2 pg 32.3   MCHC Latest Ref Range: 32.2 - 35.5 g/dL 34.4   RDW Latest Ref Range: 11.7 - 14.4 % 13.2   Platelets Latest Ref Range: 150 - 369 K/uL 306   MPV Latest Ref Range: 9.4 - 12.3 fL 9.5   SARS CoV 2 RNA Latest Ref Range: NOT DETECTED  NOT DETECTED       Physical Exam:  Physical Exam   Constitutional: She is oriented to person, place, and time. She appears well-developed and well-nourished.   HENT:   Head: Normocephalic.   Eyes: Pupils are equal, round, and reactive to light.   Neck: Normal range of motion. Neck supple.   Cardiovascular: Normal rate and intact distal pulses.   Pulmonary/Chest: Effort normal and breath sounds normal.   Abdominal: Soft. Bowel sounds are normal.   Musculoskeletal: Normal range of motion.   Neurological:  She is alert and oriented to person, place, and time.   Skin: Skin is warm and dry.     EKG 12/10/2020: atrial flutter vs. Atrial tachycardia    Date of service: 11/20/2020 1:45 PM    Background     Reason for Visit: Consultation    History from: Patient    Chief Complaint   #1: Atrial flutter    History of Present Illness:  Ms. Yee Is an 80-year-old  white female who presents to see me in consultation for atrial arrhythmias.  The patient has been diagnosed with atrial fibrillation / atrial flutter.  She is asymptomatic with her arrhythmia.  She does have a history of valvular heart disease.  She does have preserved left ventricular function.  She has severe mitral regurgitation. She has a markedly dilated left atrium.    The patient has suffered with a stroke in the past.  She had a stroke approximately 3 years ago.  She had weakness in her left hand  and left leg.  This has mostly resolved.  she did not have atrial fibrillation at the time of her stroke.      She does have hypertension.  She is not a diabetic and she has never suffered with a myocardial infarction.    Currently the patient feels well.  She denies shortness of breath except when wearing a mask.  Her daughter tells me that she does think that her mother has been short of breath for a number of years. She is currently on Xarelto and Toprol.  Unfortunately a ventricular response rate is 101 beats per minute at rest today.    Covid-19 CoronaVirus Screening    Past Medical and Surgical Histories   Past Medical History (reviewed - no changes required):   Hypertension.  Mild cerebrovascular disease.  Hypercholesterolemia.  Rt hemispheric Stroke 9/2017.  Atrial flutter 2020.     Echo 10/2020:  EF 60%.  Mitral annular calcifications with moderate to severe MR. Aortic sclerosis with mild AI.  Mild to moderate TR with PA pressure 40 mm Hg.  ECHO 9/2017:  EF 60-65%. Normal LV function. Trace MR. Trace AI. Mild TR with PA pressure 40-45 mm. Right  heart dilatation.   ECHO 2016:  EF 63%. Normal left ventricular wall motion. Stage II diastolic dysfunction. Mitral and aortic valvular sclerosis. Mild AI. Mild to moderate TR with PA pressure 51 mm. Mild to moderate MR.    NUCSTRESS 2017:  EF 66%. Negative for ischemia.     CAROTID 2017:  Plaque origin right internal carotid artery without hemodynamic significance.   CAROTIDDUPLEX 2016:  <50% ICA.  Carotid duplex : Mild plaque without hemodynamically significant stenosis.    CARDIONET 2017: Sinus rhythm with average heart rate of 54 and range of 42-96 bpm.  Frequent PVCs averaging 260/h.  Occasional PACs averaging 12/h.  Up to 11 beats of SVT.  No atrial fibrillation or atrial flutter seen.      Anemia.   Basal cell CA leg.  Squamous cell CA leg.  Cataract surgery .    Flu shot given 10/22/19 by Dr.Mary Jazzy Pedro office.  Past Surgical History (reviewed - no changes required):   Pessary 2019.    Social History     Smoked Tobacco Usage    Smoking Status:  Never    Detailed Status:  Never smoker    Smokeless Tobacco Usage     Smokeless Status:   Never    Alcohol Usage     Alcohol Status:  Current    She drinks wine infrequently.    Average drink(s) / day: Infrequent    Mostly:   Wine    Additional Social History (reviewed - no changes required):    with 1 son and 2 daughters  -retired.    Family History   Structured Family History  [01]  Father:  FH Cancer;  (11/3/2015).  [01]  Mother:  FH Cancer;  (11/3/2015).  Family History (reviewed - no changes required):   mother , CA @87  father  , prostate  grandmother MI @68     Review of Systems   General: Acknowledges weight loss. Denies weakness and weight gain.   Eyes: Denies change in vision.   Ears/Nose/Throat: Denies decreased hearing.   Cardiovascular: Denies chest pain at rest, chest pain with exercise, palpitations, peripheral edema, PND, orthopnea, dyspnea on exertion and orthostatic symptoms.    Respiratory: Denies productive cough and shortness of breath.   Gastrointestinal: Acknowledges GERD. Denies nausea and vomiting.   Musculoskeletal: Denies myalgias and joint pain.   Skin: Denies rash and itching.   Neurologic: Denies pre-syncope and syncope.   Psychiatric: Acknowledges depression and anxiety.   Endocrine: Denies cold intolerance and heat intolerance.   Heme/Lymphatic: Denies abnormal bruising and bleeding.   Allergic/Immunologic: Acknowledges seasonal allergies. Denies allergic rash.     Physical Exam  Vital Signs:     Height: 60 inches  (10/21/2020).    Weight: 120 pounds    BP: 122/80 mmHg.    Body Mass Index: 23.52    Body Surface Area: 1.50 m2.  General: Well developed and well nourished.   Eyes: ALISA, conjunctiva and sclera clear and no xanthomas.   Neck: Supple, no adenopathy and no neck masses.   Lungs: Clear to auscultation.   Chest: No obvious deformity.   Heart: S1 and S2 physiologic. Positive for irreg/irreg poorly controlled response. Examination of neck veins reveals no neck vein distention.  Carotid artery examination reveals no carotid bruits.    Extremities: There is no peripheral edema cyanosis or clubbing.   Pulses: Radial pulses are normal bilaterally.   Abdomen: Normoactive bowel sounds and soft and non-tender.   Musculoskeletal: Normal gait and normal strength and tone.   Skin: Warm and dry and no rashes.   Neurologic: Alert, oriented x 3 and appropriate affect.       Resting Electrocardiogram   ECG Date:  11/20/2020   Atrial flutter   Heart Rate: 101 bpm.  QRS: 94 msec. QT: 312 msec.   Cannot exclude old inferior infarction   Cannot exclude old anterior infarction      Medications:   XARELTO 20 MG ORAL TABLET (RIVAROXABAN) one by mouth once a day; Route: ORAL  ATORVASTATIN CALCIUM 80 MG ORAL TABLET (ATORVASTATIN CALCIUM) take half tablet by mouth in the morning andf half tablet by mouth in the evening; Route: ORAL  EZETIMIBE 10MG TABLETS (EZETIMIBE) TAKE 1 TABLET BY MOUTH  EVERY DAY  TOPROL XL 50 MG ORAL TABLET EXTENDED RELEASE 24 HOUR (METOPROLOL SUCCINATE) one by mouth twice daily  TRIAMTERENE-HCTZ 37.5-25 MG ORAL TABLET (TRIAMTERENE-HCTZ) one tablet daily  AMLODIPINE BESYLATE 5 MG ORAL TABLET (AMLODIPINE BESYLATE) 1 tablet daily by mouth; Route: ORAL  LEXAPRO 10 MG ORAL TABLET (ESCITALOPRAM OXALATE) 1 tab daily; Route: ORAL  [employer] OMEPRAZOLE 20 MG ORAL TABLET DELAYED RELEASE (OMEPRAZOLE) one tablet daily; Route: ORAL  VITAMIN D3 2000 UNIT ORAL TABLET (CHOLECALCIFEROL) one tablet twice daily by mouth; Route: ORAL  DAILY MULTIVITAMIN ORAL CAPSULE (MULTIPLE VITAMINS-MINERALS) one tablet daily by mouth; Route: ORAL  LEVOTHYROXINE SODIUM 50 MCG ORAL TABLET (LEVOTHYROXINE SODIUM) 1 tablet daily by mouth; Route: ORAL  Medications reviewed today.    Medications list verified with patient by Nakul Swan M.D..    Allergies, Intolerances and/or Therapeutic Variances:    SCOPOLAMINE HBR (SCOPOLAMINE HBR)  [DRUG]  (Onset: 05/01/2012) Critical: years ago pt intolerant  ASPIRIN (ASPIRIN)  [DRUG]  (Onset: 04/22/2015) Moderate: GERD  Allergies and adverse reactions reviewed today.      Problems:   ATRIAL FLUTTER, PAROXYSMAL (ICD-427.32) (VJG20-F64.0)  ATRIAL TACHYCARDIA, ECTOPIC YESIKA RHYTHM (ICD-427.89) (DRQ67-P94.8)  COVID-19 COUNSELING PERFORMED (ICD-V65.49) (AHE10-D46.89)  CVA WITH LEFT HEMIPARESIS (ICD-438.20) (JYQ56-Z92.959)  PVC'S (ICD-427.69) (OMI48-G81.3)  AORTIC INSUFFICIENCY (ICD-424.1) (JVC16-R80.1)  MITRAL REGURGITATION (ICD-424.0) (JJR13-A07.0)  ABNORMAL ELECTROCARDIOGRAM (ICD-794.31) (VNR69-Q41.31)  HYPOTHYROIDISM (ICD-244.9) (UBK56-R03.9)  HYPERCHOLESTEROLEMIA (ICD-272.0) (SBB60-W73.0)  CARDIAC MURMUR, UNSPECIFIED (ICD-785.2) (MUD93-W39.1)  HYPERTENSION, BENIGN ESSENTIAL (ICD-401.1) (HQB83-V91)  CAROTID ARTERY DISEASE MILD (ICD-433.10) (VJY89-J96.29)  Problems reviewed today.      Impression:  Ms. Yee Is an 80-year-old white female with the following problems:     1.  Persistent atrial flutter - the patient is in atrial flutter with an uncontrolled ventricular response rate.  She was in normal rhythm a year ago.  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  She has been anticoagulated with Xarelto.  I recommend that she consider undergoing an ablation procedure to restore sinus rhythm.  This may improve her mitral regurgitation.  Her left atrium is however already significantly enlarged and it is likely that in the long run, she will go into atrial fibrillation.  The patient is relatively asymptomatic.  I have explained the potential risks of the ablation in detail to the patient and her daughter.    2.  PVCs - the patient was noted to have PVCs on her 24 hour Holter.  There were not significant.    3.  Significant mitral regurgitation -the patient's left atrium is enlarged.  She has atrial flutter.  It is likely that she will develop atrial fibrillation in the future and may need a mitral valve repair or replacement.    I reviewed the patient's chart.  I discussed the plan for the procedure.  The patient has been feeling well.  She was reluctant to undergo a left-sided procedure.  I confirmed that we would only perform an ablation if she had right sided atrial flutter.  I outlined the potential risks and benefits of the procedure.  I explained to her that we would perform a cardioversion if we documented that she had left atrial flutter.

## 2020-12-10 NOTE — POST-OP (NON-BILLABLE)
Walked patient to the bathroom when she was finished, noticed that she was bleeding down her leg, pressure held and redressed without dificulty

## 2020-12-10 NOTE — ANESTHESIOLOGIST PRE-PROCEDURE ATTESTATION
Pre-Procedure Patient Identification:  I am the Primary Anesthesiologist and have identified the patient on 12/10/20 at 10:34 AM.   I have confirmed the following procedure(s) ABLATION A-FIB will be performed by the following surgeon/proceduralist Nakul Swan MD.

## 2020-12-10 NOTE — PROGRESS NOTES
I reviewed the patient's records.  I spoke with the patient.  The patient is relatively asymptomatic.  She is in what appears to be atrial flutter.  It could be atypical atrial flutter originating from the right pulmonary vein.  The patient's left atrium is markedly dilated.  She has significant mitral regurgitation.  I do not intend performing a left-sided ablation.  The patient has been fully anticoagulated.  She will undergo a transesophageal echocardiogram as she has previously had a cerebrovascular accident.  If we find typical atrial flutter, she will undergo a right-sided flutter ablation.  If she is found to have left atrial flutter, an ablation would not be performed.  At that point, she will be cardioverted.  She will be placed on amiodarone therapy.  The patient fully understands the plan.  She understands the risks of the procedure which include hematoma, infections, cardiac perforation, heart block, need for pacemaker, death.

## 2020-12-11 LAB
ATRIAL RATE: 220
QRS DURATION: 96
QT INTERVAL: 360
QTC CALCULATION(BAZETT): 452
R AXIS: -35
T WAVE AXIS: 76
VENTRICULAR RATE: 95

## 2020-12-16 LAB — BSA FOR ECHO PROCEDURE: 1.52 M2

## 2021-02-11 ENCOUNTER — IMMUNIZATIONS (OUTPATIENT)
Dept: FAMILY MEDICINE CLINIC | Facility: HOSPITAL | Age: 81
End: 2021-02-11

## 2021-02-11 DIAGNOSIS — Z23 ENCOUNTER FOR IMMUNIZATION: Primary | ICD-10-CM

## 2021-02-11 PROCEDURE — 91300 SARS-COV-2 / COVID-19 MRNA VACCINE (PFIZER-BIONTECH) 30 MCG: CPT

## 2021-02-11 PROCEDURE — 0001A SARS-COV-2 / COVID-19 MRNA VACCINE (PFIZER-BIONTECH) 30 MCG: CPT

## 2021-03-02 ENCOUNTER — IMMUNIZATIONS (OUTPATIENT)
Dept: FAMILY MEDICINE CLINIC | Facility: HOSPITAL | Age: 81
End: 2021-03-02

## 2021-03-02 DIAGNOSIS — Z23 ENCOUNTER FOR IMMUNIZATION: Primary | ICD-10-CM

## 2021-03-02 PROCEDURE — 91300 SARS-COV-2 / COVID-19 MRNA VACCINE (PFIZER-BIONTECH) 30 MCG: CPT

## 2021-03-02 PROCEDURE — 0002A SARS-COV-2 / COVID-19 MRNA VACCINE (PFIZER-BIONTECH) 30 MCG: CPT

## (undated) DEVICE — ***USE 121412***PACK DEVICE IMPLANT TLH

## (undated) DEVICE — Device

## (undated) DEVICE — PATCHES C3

## (undated) DEVICE — SHEATH ULTIMUM 7FR ACT 12CM 10/BX

## (undated) DEVICE — KIT CATH LAB ANGIO

## (undated) DEVICE — NEEDLE PERCUTANEOUS ENTRY

## (undated) DEVICE — CATH DYNAMIC DECA 6FR LARGE 4.0

## (undated) DEVICE — SHEATH ULTIMUM 8FR ACT 12CM 10/BX

## (undated) DEVICE — TUBING SMART ABLATE